# Patient Record
Sex: FEMALE | Race: OTHER
[De-identification: names, ages, dates, MRNs, and addresses within clinical notes are randomized per-mention and may not be internally consistent; named-entity substitution may affect disease eponyms.]

---

## 2022-04-30 ENCOUNTER — FORM ENCOUNTER (OUTPATIENT)
Age: 67
End: 2022-04-30

## 2022-06-22 PROBLEM — Z00.00 ENCOUNTER FOR PREVENTIVE HEALTH EXAMINATION: Status: ACTIVE | Noted: 2022-06-22

## 2022-06-26 ENCOUNTER — NON-APPOINTMENT (OUTPATIENT)
Age: 67
End: 2022-06-26

## 2022-07-01 ENCOUNTER — APPOINTMENT (OUTPATIENT)
Dept: ORTHOPEDIC SURGERY | Facility: CLINIC | Age: 67
End: 2022-07-01

## 2022-07-01 VITALS — HEART RATE: 68 BPM | DIASTOLIC BLOOD PRESSURE: 92 MMHG | OXYGEN SATURATION: 95 % | SYSTOLIC BLOOD PRESSURE: 136 MMHG

## 2022-07-01 DIAGNOSIS — I89.0 LYMPHEDEMA, NOT ELSEWHERE CLASSIFIED: ICD-10-CM

## 2022-07-01 DIAGNOSIS — R22.41 LOCALIZED SWELLING, MASS AND LUMP, RIGHT LOWER LIMB: ICD-10-CM

## 2022-07-01 PROCEDURE — 99244 OFF/OP CNSLTJ NEW/EST MOD 40: CPT

## 2022-07-01 NOTE — PHYSICAL EXAM
[FreeTextEntry1] : On exam the patient stands in good balance.  She is able to walk appropriately.  She has a palpable mass in the anterior right groin approximately 10 to 15 cm.  This is ill-defined.  It is not pulsatile.  She does have swelling throughout the entire extremity as well compared to the other side with 1+ edema.  She has good range of motion of her hip limited by her body habitus similar to the other side.  She has no Tinel's, thrills or bruits and is neurovascular intact without lesion as well as distally. [General Appearance - Well-Appearing] : Well appearing [General Appearance - Well Nourished] : well nourished [Oriented To Time, Place, And Person] : Oriented to person, place, and time [Affect] : The affect was normal. [Impaired Insight] : Insight and judgment were intact [Mood] : the mood was normal [Sclera] : the sclera and conjunctiva were normal [Neck Cervical Mass (___cm)] : no neck mass was observed [Heart Rate And Rhythm] : heart rate was normal and rhythm regular [] : No respiratory distress [Abdomen Soft] : Soft [Normal Station and Gait] : gait and station were normal [Swelling] : swelling [Masses] : masses [Skin Changes - Describe changes:] : No skin changes noted [LE  Motor Strength Normal unless otherwise noted:] : 5/5 strength in bilateral lower extemities unless otherwise noted. [Full ROM Unless otherwise noted:] : Full range of motion unless otherwise noted: [Normal] : Sensation intact to light touch.

## 2022-07-01 NOTE — DISCUSSION/SUMMARY
[Surgical risks reviewed] : Surgical risks reviewed [All Questions Answered] : Patient (and family) had all questions answered to an agreeable level of satisfaction [Interested in Proceeding] : Patient (and family) expressed understanding and interest in proceeding with the plan as outlined [de-identified] : Patient is a less likely in the sartorius area coming from the pelvis going down to the level of the lesser trochanter.  This also could be coming from the iliopsoas.  Regardless I think this is compressing on the vein and possibly lymph nodes and could be one of the causes of her swelling.  While taking it out this should help.  There is going to be femoral nerve in the area as well as vessels in the area however I still think this should come out fairly straightforward.  There is risk of nerve and vessel injuries as well as recurrence.\par \par I have discussed with the patient the spectrum of fat containing lesions ranging  from small simple lipoma is to atypical lipomatous tumors to low-grade malignancy is to full duty differentiated liposarcomas. We discussed how atypical lipomatous tumors and to have a chance of recurrence that is higher than regular lipomas. We discussed that when recurring they also have a small chance of coming back as a malignancy. We discussed that the terminology of low-grade sarcoma vs. atypical lipomatous tumor has to do with the location of the lesion as well as its aggressiveness. Atypical lipomatous tumor is diagnosed mostly based on its genetic characteristics.\par \par If imaging was ordered, the patient was told to make an appointment to review findings right after all imaging is completed.\par \par We discussed risks, benefits and alternatives. Rationale of care was reviewed and all questions were answered. Patient (and family) had all questions answered to her degree of the level of satisfaction. Patient (and family) expressed understanding and interest in proceeding with the plan as outlined.\par \par \par \par \par This note was done with a voice recognition transcription software and any typos are related to this rather than medical error. Surgical risks reviewed. Patient (and family) had all questions answered to an agreeable level of satisfaction. Patient (and family) expressed understanding and interest in proceeding with the plan as outlined.  \par

## 2022-07-01 NOTE — CONSULT LETTER
[Dear  ___] : Dear  [unfilled], [FreeTextEntry2] : Joel Carpio MD\par [address] [place], [place] [address] [place]\par [place] [id number] [FreeTextEntry1] : \par After evaluating your patient and reviewing the studies presented we have come to a mutually agreeable plan. \par \par Please see my note below.\par \par Should you have further questions, please feel free to call and discuss the care of your patient.\par \par Thank you for the confidence of your referral.\par \par Sincerely, \par \par Adam Santana MD \par Chief, Musculoskeletal Oncology \par \par [id number]\par [phone]

## 2022-07-01 NOTE — DATA REVIEWED
[Imaging Present] : Present [de-identified] : I personally reviewed the following imaging studies both images and report with my own interpretation as below:\par \par MRI of the proximal right thigh with and without contrast from 5/17/2022 shows a 10 x 10 mass anterior to the proximal right thigh in the area of the sartorius coming from the ASIS consistent with lipoma.  There is no atypia seen in it.

## 2022-07-01 NOTE — REVIEW OF SYSTEMS
[Sight Problems] : sight problems [Lower Ext Edema] : lower extremity edema [Muscle Weakness] : muscle weakness [Feeling Weak] : feeling week

## 2022-07-01 NOTE — HISTORY OF PRESENT ILLNESS
[FreeTextEntry1] : This 67-year-old female comes in with a large right side proximal mass.  She is also has significant swelling in this leg.  She did have some percutaneous minimally invasive work done for varicose veins which she said did not work however she has been swollen since then.  The entire leg seems like it has some lymphedema.  There are no ulcers or any other problems.  She also has a palpable mass in the right groin anteriorly.  She had MRI scan showing a lesion was sent to for further evaluation.  Of note she also twisted her ankle and is having some pain on and off with some swelling medially. [2] : currently ~his/her~ pain is 2 out of 10 [Direct Pressure] : worsened by direct pressure [None] : No relieving factors are noted